# Patient Record
Sex: MALE | Race: WHITE | NOT HISPANIC OR LATINO | URBAN - METROPOLITAN AREA
[De-identification: names, ages, dates, MRNs, and addresses within clinical notes are randomized per-mention and may not be internally consistent; named-entity substitution may affect disease eponyms.]

---

## 2017-02-01 NOTE — PATIENT DISCUSSION
(H40.013) Open angle with borderline findings, low risk, bilateral - Assesment : Examination revealed suspicion for Open Angle Glaucoma. Asymmetrical nerves. OCT ONH wnl and stable today. - Plan : Monitor for changes.   Monitor with OCT ONH prn changes or Dr's request.

## 2017-02-01 NOTE — PATIENT DISCUSSION
(J50.919) Keratoconjunct sicca, not specified as Sjogren's, bilateral - Assesment : Examination revealed Dry Eye Syndrome - Plan : Monitor for changes. Advised patient to call our office with decreased vision or increased symptoms. ATs prn daily.

## 2017-02-01 NOTE — PATIENT DISCUSSION
(H35.373) Puckering of macula, bilateral - Assesment : Examination revealed ERM. Mild. - Plan : Monitor. RTC in 1 year for Exam and Mac OCT, sooner if problems or changes occur.

## 2017-02-01 NOTE — PATIENT DISCUSSION
(H43.311) Vitreous membranes and strands, right eye - Assesment : Examination revealed vitreous degeneration. - Plan : Call with any changes, increase in floaters, seeing flashes, dark shadows or curtains.

## 2017-02-01 NOTE — PATIENT DISCUSSION
(H25.13) Age-related nuclear cataract, bilateral - Assesment : Examination revealed cataract. Mild symptoms. - Plan : Monitor for changes. Advised patient of condition of cataracts. Updated GLRx gien today. Call with vision changes.

## 2021-12-06 ENCOUNTER — COMPREHENSIVE EXAM (OUTPATIENT)
Dept: URBAN - METROPOLITAN AREA CLINIC 10 | Facility: CLINIC | Age: 26
End: 2021-12-06

## 2021-12-06 DIAGNOSIS — H52.13: ICD-10-CM

## 2021-12-06 PROCEDURE — 92310C CONTACT LENS 75

## 2021-12-06 PROCEDURE — 92015 DETERMINE REFRACTIVE STATE: CPT

## 2021-12-06 PROCEDURE — 92014 COMPRE OPH EXAM EST PT 1/>: CPT

## 2021-12-06 ASSESSMENT — KERATOMETRY
OD_AXISANGLE_DEGREES: 170
OS_K1POWER_DIOPTERS: 42.25
OS_AXISANGLE_DEGREES: 170
OD_AXISANGLE2_DEGREES: 80
OS_K2POWER_DIOPTERS: 43.50
OS_AXISANGLE2_DEGREES: 80
OD_K1POWER_DIOPTERS: 42.75
OD_K2POWER_DIOPTERS: 43.75

## 2021-12-06 ASSESSMENT — VISUAL ACUITY
OD_CC: 20/25
OS_CC: 20/25
OU_CC: 20/25

## 2021-12-06 ASSESSMENT — TONOMETRY
OS_IOP_MMHG: 17
OD_IOP_MMHG: 117

## 2023-10-23 ENCOUNTER — ESTABLISHED PATIENT (OUTPATIENT)
Dept: URBAN - METROPOLITAN AREA CLINIC 10 | Facility: CLINIC | Age: 28
End: 2023-10-23

## 2023-10-23 DIAGNOSIS — H52.13: ICD-10-CM

## 2023-10-23 PROCEDURE — 92014 COMPRE OPH EXAM EST PT 1/>: CPT

## 2023-10-23 PROCEDURE — 92310C CONTACT LENS 75

## 2023-10-23 PROCEDURE — 92015 DETERMINE REFRACTIVE STATE: CPT

## 2023-10-23 ASSESSMENT — KERATOMETRY
OS_K1POWER_DIOPTERS: 42.00
OS_K2POWER_DIOPTERS: 43.25
OD_AXISANGLE_DEGREES: 175
OD_AXISANGLE2_DEGREES: 85
OS_AXISANGLE_DEGREES: 170
OD_K2POWER_DIOPTERS: 43.50
OS_AXISANGLE2_DEGREES: 80
OD_K1POWER_DIOPTERS: 42.50

## 2023-10-23 ASSESSMENT — VISUAL ACUITY
OU_CC: 20/20
OS_CC: 20/20
OD_CC: 20/20

## 2023-10-23 ASSESSMENT — TONOMETRY
OS_IOP_MMHG: 15
OD_IOP_MMHG: 14